# Patient Record
Sex: MALE | Race: WHITE | URBAN - METROPOLITAN AREA
[De-identification: names, ages, dates, MRNs, and addresses within clinical notes are randomized per-mention and may not be internally consistent; named-entity substitution may affect disease eponyms.]

---

## 2022-12-02 ENCOUNTER — CLINICAL DOCUMENTATION (OUTPATIENT)
Dept: ORTHOPEDIC SURGERY | Age: 57
End: 2022-12-02

## 2022-12-02 NOTE — PROGRESS NOTES
Pt brought a MRI disc from the [place] but it couldn't be uploaded and when the images were pulled up they were individual not like a normal MRI and there were not xrays. I told the pt about the disc and then told him we would need to get new xrays at the visit since we couldn't see the images he brought well. The pt stated he has had xrays before and they don't show anything so he wasn't going to get them again. He asked what we needed and I told him a proper copy of his MRI and not individual jpeg images. He said ok and walked out. I canceled his appointment. He didn't take the disc with him so I will keep it at our desk [facility].

## 2023-01-18 ENCOUNTER — OFFICE VISIT (OUTPATIENT)
Dept: ORTHOPEDIC SURGERY | Age: 58
End: 2023-01-18

## 2023-01-18 DIAGNOSIS — M25.562 LEFT KNEE PAIN, UNSPECIFIED CHRONICITY: Primary | ICD-10-CM

## 2023-01-18 RX ORDER — MELOXICAM 7.5 MG/1
7.5 TABLET ORAL DAILY
COMMUNITY

## 2023-01-18 RX ORDER — NITROGLYCERIN 0.4 MG/1
0.4 TABLET SUBLINGUAL
COMMUNITY
Start: 2022-04-26

## 2023-01-18 RX ORDER — AMLODIPINE BESYLATE 10 MG/1
5 TABLET ORAL
COMMUNITY
Start: 2022-04-26

## 2023-01-18 RX ORDER — ISOSORBIDE MONONITRATE 30 MG/1
30 TABLET, EXTENDED RELEASE ORAL
COMMUNITY
Start: 2022-08-26

## 2023-01-18 RX ORDER — IBUPROFEN 800 MG/1
TABLET ORAL
COMMUNITY
Start: 2022-11-01

## 2023-01-18 NOTE — PROGRESS NOTES
Name: Veronika Trinidad  YOB: 1965  Gender: male  MRN: [id number]    CC:   Chief Complaint   Patient presents with    Knee Pain     Left knee     Left  medial KNEE PAIN; STIFFNESS     HPI: 59-year-old male with several history of left medial knee pain. He is treated conservatively for many years including with therapy. He was in the [place] system so it took him a while to get approved to have an MRI once he had it this past November he was referred down to evaluate further. Causes mechanical like symptoms. Allergies   Allergen Reactions    Lamotrigine Rash     History reviewed. No pertinent past medical history. History reviewed. No pertinent surgical history. History reviewed. No pertinent family history. Social History     Socioeconomic History    Marital status:      Spouse name: Not on file    Number of children: Not on file    Years of education: Not on file    Highest education level: Not on file   Occupational History    Not on file   Tobacco Use    Smoking status: Never    Smokeless tobacco: Never   Substance and Sexual Activity    Alcohol use: Not on file    Drug use: Not on file    Sexual activity: Not on file   Other Topics Concern    Not on file   Social History Narrative    Not on file     Social Determinants of Health     Financial Resource Strain: Not on file   Food Insecurity: Not on file   Transportation Needs: Not on file   Physical Activity: Not on file   Stress: Not on file   Social Connections: Not on file   Intimate Partner Violence: Not on file   Housing Stability: Not on file        No flowsheet data found. ROS: Also noted on the patient medical history form in the chart and are reviewed today. Pertinent positives and negatives are addressed with the patient, particularly those related to musculoskeletal concerns. Non-orthopaedic concerns were referred back to the primary care physician.      PE:  General appearance is that of a healthy patient, alert and oriented, in no distress. Neck shows no significant abnormalities. Back and bilateral hips show no significant abnormalities with good ROM and no referral to LE with movement. No tenderness to bilateral hips. R and L upper extremities show no significant abnormalities. Both calves are soft. Dorsalis pedis pulses are 2+ and symmetrical.    Motor and sensory exam is intact and equal in both feet. KNEE Left (involved)  Right   Skin Intact Intact   Atrophy None None   Effusion trace None noted   ROM  full Full   Strength No weakness No weakness   Palpation TTP medial joint line. Non-tender throughout   Patellar Tracking Normal: J sign: negative. Crepitus 1+ None   Patellar Apprehension Negative Negative   Silvina Test positive Negative   Pivot Shift Negative Negative   Post Drawer Negative Negative   Varus  @ 0 and 30deg Negative Negative   Valgus @ 0 and 30deg Negative Negative   Gait Minimally antalgic Normal     MRI LEFT KNEE:       I reviewed the MRI today and independently in the office from 11/15/2020 to the left knee which shows partial tearing of the medial meniscus posterior horn. Cartilage appears relatively normal there this is noted on sagittal image 26. ACL and PCL appear intact there is some degenerative change underneath the patella especially in the upper half. Some cystic change posteriorly. Assessment:      ICD-10-CM    1. 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 Given the nature of the injury and the longevity that he is had as well as his failure of treatment with therapy I think it is prudent to proceed with knee arthroscopy for partial medial meniscectomy. Treatment:   The patient desires arthroscopy of the left knee for partial medial meniscectomy.   I talked with them extensively about the risks, benefits, reasonable expectations and expected recovery time as well as possible complications including but not limited to bleeding, infection, stiffness, pain, continued problems, DVT, PE, MI and other anesthesia related risks etc.  I also talked extensively about the risks of a prolonged or incomplete recovery with any degenerative changes present. The patient seems to understand and wishes to proceed. I gave them education literature and answered their questions. Return for Surgery.      Rebeca Villatoro MD  01/18/23

## 2023-01-23 ENCOUNTER — TELEPHONE (OUTPATIENT)
Dept: ORTHOPEDIC SURGERY | Age: 58
End: 2023-01-23

## 2023-01-23 DIAGNOSIS — M25.562 LEFT KNEE PAIN, UNSPECIFIED CHRONICITY: Primary | ICD-10-CM

## 2023-01-23 NOTE — PERIOP NOTE
Patient verified name and . Order for consent not found in EHR; patient verifies procedure. Type 1B surgery, phone assessment complete. Orders not received. Labs per surgeon: none  Labs per anesthesia protocol: none    Patient answered medical/surgical history questions at their best of ability. All prior to admission medications documented in [facility]. Patient instructed to take the following medications the day of surgery according to anesthesia guidelines with a small sip of water: Bring nitro with yo dos. On the day before surgery please take Acetaminophen 1000mg in the morning and then again before bed. You may substitute for Tylenol 650 mg. Hold all vitamins 7 days prior to surgery and NSAIDS (mobic and Advil)  5 days prior to surgery. Prescription meds to hold:none  Patient instructed on the following:    > Arrive at [facility], time of arrival to be called the day before by 1700  > NPO after midnight, unless otherwise indicated, including gum, mints, and ice chips  > Responsible adult must drive patient to the hospital, stay during surgery, and patient will need supervision 24 hours after anesthesia  > Use antibacterial soap in shower the night before surgery and on the morning of surgery  > All piercings must be removed prior to arrival.    > Leave all valuables (money and jewelry) at home but bring insurance card and ID on DOS.   > You may be required to pay a deductible or co-pay on the day of your procedure. You can pre-pay by calling [phone] if your surgery is at the [facility] or [phone] if your surgery is at the [facility]. > Do not wear make-up, nail polish, lotions, cologne, perfumes, powders, or oil on skin. Artificial nails are not permitted.

## 2023-01-28 ENCOUNTER — ANESTHESIA EVENT (OUTPATIENT)
Dept: SURGERY | Age: 58
End: 2023-01-28
Payer: OTHER GOVERNMENT

## 2023-01-28 NOTE — ANESTHESIA PRE PROCEDURE
Department of Anesthesiology  Preprocedure Note       Name:  Sunni Hopkins   Age:  61 y.o.  :  1965                                          MRN:  [id number]         Date:  2023      Surgeon: Vicky Martin): Radha Fernandez MD    Procedure: Procedure(s):  LEFT KNEE ARTHROSCOPY MEDIAL MENISCECTOMY    Medications prior to admission:   Prior to Admission medications    Medication Sig Start Date End Date Taking? Authorizing Provider   amLODIPine (NORVASC) 10 MG tablet 5 mg as needed 22   Historical Provider, MD   diclofenac sodium (VOLTAREN) 1 % GEL APPLY 4 GRAMS TOPICALLY TWICE A DAY AS NEEDED --AVOID SUNLIGHT 22   Historical Provider, MD   ibuprofen (ADVIL;MOTRIN) 800 MG tablet TAKE 1/2 TO 1 TABLET BY MOUTH THREE TIMES A DAY AS NEEDED TAKE WITH FOOD, DO NOT COMBINE WITH MELOXICAM OR OTHER NSAIDS 22   Historical Provider, MD   meloxicam (MOBIC) 7.5 MG tablet Take 7.5 mg by mouth daily    Historical Provider, MD   nitroGLYCERIN (NITROSTAT) 0.4 MG SL tablet 0.4 mg 22   Historical Provider, MD       Current medications:    No current facility-administered medications for this encounter. Current Outpatient Medications   Medication Sig Dispense Refill    amLODIPine (NORVASC) 10 MG tablet 5 mg as needed      diclofenac sodium (VOLTAREN) 1 % GEL APPLY 4 GRAMS TOPICALLY TWICE A DAY AS NEEDED --AVOID SUNLIGHT      ibuprofen (ADVIL;MOTRIN) 800 MG tablet TAKE 1/2 TO 1 TABLET BY MOUTH THREE TIMES A DAY AS NEEDED TAKE WITH FOOD, DO NOT COMBINE WITH MELOXICAM OR OTHER NSAIDS      meloxicam (MOBIC) 7.5 MG tablet Take 7.5 mg by mouth daily      nitroGLYCERIN (NITROSTAT) 0.4 MG SL tablet 0.4 mg         Allergies: Allergies   Allergen Reactions    Lamotrigine Rash       Problem List:    Patient Active Problem List   Diagnosis Code    Left knee pain M25.562       Past Medical History:        Diagnosis Date    Abnormal stress test     in hx.   Showed possible basal anteroseptal ischemia versus artifact; LV function normal    CAD (coronary artery disease)     Coronary vasospasm ([address] 2004    has intermittent CP, manages with nitro; followed by [employer] Hypertension     stable, off meds    MORTEZA (obstructive sleep apnea)        Past Surgical History:        Procedure Laterality Date    CARDIAC CATHETERIZATION  2004    done after possible MI but normal    WISDOM TOOTH EXTRACTION         Social History:    Social History     Tobacco Use    Smoking status: Never    Smokeless tobacco: Never   Substance Use Topics    Alcohol use: Not Currently                                Counseling given: Not Answered      Vital Signs (Current):   Vitals:    01/23/23 1133   Weight: 180 lb (81.6 kg)   Height: 5' 9\" (1.753 m)                                              BP Readings from Last 3 Encounters:   No data found for BP       NPO Status:                                                                                 BMI:   Wt Readings from Last 3 Encounters:   No data found for Wt     Body mass index is 26.58 kg/m². CBC: No results found for: WBC, RBC, HGB, HCT, MCV, RDW, PLT    CMP: No results found for: NA, K, CL, CO2, BUN, CREATININE, GFRAA, AGRATIO, LABGLOM, GLUCOSE, GLU, PROT, CALCIUM, BILITOT, ALKPHOS, AST, ALT    POC Tests: No results for input(s): POCGLU, POCNA, POCK, POCCL, POCBUN, POCHEMO, POCHCT in the last 72 hours.     Coags: No results found for: PROTIME, INR, APTT    HCG (If Applicable): No results found for: PREGTESTUR, PREGSERUM, HCG, HCGQUANT     ABGs: No results found for: PHART, PO2ART, YPM1VQU, RTV4SBD, BEART, T1SPWGUL     Type & Screen (If Applicable):  No results found for: LABABO, LABRH    Drug/Infectious Status (If Applicable):  No results found for: HIV, HEPCAB    COVID-19 Screening (If Applicable): No results found for: COVID19        Anesthesia Evaluation  Patient summary reviewed  Airway: Mallampati: II  TM distance: >3 FB   Neck ROM: full  Mouth opening: > = 3 FB   Dental: normal exam         Pulmonary:normal exam    (+) sleep apnea:                             Cardiovascular:  Exercise tolerance: good (>4 METS), Swims laps  (+) hypertension:, angina (no recent chest pain):, CAD (cath 2005, coronary vasopasm): non-obstructive,                ROS comment: NST 2022:  normal LV function. Perfusion imaging showed possible mild basal anteroseptal ischemia versus artifact. Neuro/Psych:               GI/Hepatic/Renal:             Endo/Other:                     Abdominal:             Vascular: Other Findings:           Anesthesia Plan      general     ASA 3             Anesthetic plan and risks discussed with patient.                         Idris Centeno MD   1/28/2023

## 2023-01-29 DIAGNOSIS — Z09 SURGERY FOLLOW-UP: Primary | ICD-10-CM

## 2023-01-29 PROBLEM — M23.204 DEGENERATIVE TEAR OF LEFT MEDIAL MENISCUS: Status: ACTIVE | Noted: 2023-01-29

## 2023-01-29 RX ORDER — OXYCODONE HYDROCHLORIDE 5 MG/1
5-10 TABLET ORAL
Qty: 30 TABLET | Refills: 0 | Status: SHIPPED | OUTPATIENT
Start: 2023-01-29 | End: 2023-02-01

## 2023-01-29 RX ORDER — AMOXICILLIN 250 MG
1 CAPSULE ORAL DAILY
Qty: 21 TABLET | Refills: 0 | Status: SHIPPED | OUTPATIENT
Start: 2023-01-29

## 2023-01-29 RX ORDER — ASPIRIN 325 MG
325 TABLET ORAL DAILY
Qty: 7 TABLET | Refills: 0 | Status: SHIPPED | OUTPATIENT
Start: 2023-01-29 | End: 2023-02-05

## 2023-01-29 RX ORDER — ACETAMINOPHEN 500 MG
500 TABLET ORAL 4 TIMES DAILY PRN
Qty: 40 TABLET | Refills: 0 | Status: SHIPPED | OUTPATIENT
Start: 2023-01-29 | End: 2023-02-08

## 2023-01-29 RX ORDER — MELOXICAM 7.5 MG/1
7.5 TABLET ORAL 2 TIMES DAILY PRN
Qty: 28 TABLET | Refills: 0 | Status: SHIPPED | OUTPATIENT
Start: 2023-01-29 | End: 2023-02-12

## 2023-01-29 RX ORDER — ONDANSETRON 8 MG/1
4 TABLET, ORALLY DISINTEGRATING ORAL EVERY 6 HOURS
Qty: 16 TABLET | Refills: 0 | Status: SHIPPED | OUTPATIENT
Start: 2023-01-29

## 2023-01-30 ENCOUNTER — ANESTHESIA (OUTPATIENT)
Dept: SURGERY | Age: 58
End: 2023-01-30
Payer: OTHER GOVERNMENT

## 2023-01-30 ENCOUNTER — HOSPITAL ENCOUNTER (OUTPATIENT)
Age: 58
Setting detail: OUTPATIENT SURGERY
Discharge: HOME | End: 2023-01-30
Attending: ORTHOPAEDIC SURGERY | Admitting: ORTHOPAEDIC SURGERY
Payer: OTHER GOVERNMENT

## 2023-01-30 VITALS
HEIGHT: 69 IN | RESPIRATION RATE: 16 BRPM | BODY MASS INDEX: 26.66 KG/M2 | TEMPERATURE: 60.8 F | SYSTOLIC BLOOD PRESSURE: 132 MMHG | OXYGEN SATURATION: 96 % | WEIGHT: 180 LBS | DIASTOLIC BLOOD PRESSURE: 83 MMHG | HEART RATE: 65 BPM

## 2023-01-30 DIAGNOSIS — Z09 SURGERY FOLLOW-UP: ICD-10-CM

## 2023-01-30 PROCEDURE — 3600000014 HC SURGERY LEVEL 4 ADDTL 15MIN: Performed by: ORTHOPAEDIC SURGERY

## 2023-01-30 PROCEDURE — 3700000001 HC ADD 15 MINUTES (ANESTHESIA): Performed by: ORTHOPAEDIC SURGERY

## 2023-01-30 PROCEDURE — 3600000004 HC SURGERY LEVEL 4 BASE: Performed by: ORTHOPAEDIC SURGERY

## 2023-01-30 PROCEDURE — 6360000002 HC RX W HCPCS: Performed by: NURSE ANESTHETIST, CERTIFIED REGISTERED

## 2023-01-30 PROCEDURE — 6360000002 HC RX W HCPCS: Performed by: PHYSICIAN ASSISTANT

## 2023-01-30 PROCEDURE — 2580000003 HC RX 258: Performed by: NURSE ANESTHETIST, CERTIFIED REGISTERED

## 2023-01-30 PROCEDURE — 7100000001 HC PACU RECOVERY - ADDTL 15 MIN: Performed by: ORTHOPAEDIC SURGERY

## 2023-01-30 PROCEDURE — 2720000010 HC SURG SUPPLY STERILE: Performed by: ORTHOPAEDIC SURGERY

## 2023-01-30 PROCEDURE — 7100000010 HC PHASE II RECOVERY - FIRST 15 MIN: Performed by: ORTHOPAEDIC SURGERY

## 2023-01-30 PROCEDURE — 29881 ARTHRS KNE SRG MNISECTMY M/L: CPT | Performed by: ORTHOPAEDIC SURGERY

## 2023-01-30 PROCEDURE — 6370000000 HC RX 637 (ALT 250 FOR IP): Performed by: ANESTHESIOLOGY

## 2023-01-30 PROCEDURE — 7100000011 HC PHASE II RECOVERY - ADDTL 15 MIN: Performed by: ORTHOPAEDIC SURGERY

## 2023-01-30 PROCEDURE — 6360000002 HC RX W HCPCS: Performed by: ORTHOPAEDIC SURGERY

## 2023-01-30 PROCEDURE — 3700000000 HC ANESTHESIA ATTENDED CARE: Performed by: ORTHOPAEDIC SURGERY

## 2023-01-30 PROCEDURE — 7100000000 HC PACU RECOVERY - FIRST 15 MIN: Performed by: ORTHOPAEDIC SURGERY

## 2023-01-30 PROCEDURE — 2500000003 HC RX 250 WO HCPCS: Performed by: NURSE ANESTHETIST, CERTIFIED REGISTERED

## 2023-01-30 PROCEDURE — 6360000002 HC RX W HCPCS: Performed by: ANESTHESIOLOGY

## 2023-01-30 PROCEDURE — 2709999900 HC NON-CHARGEABLE SUPPLY: Performed by: ORTHOPAEDIC SURGERY

## 2023-01-30 RX ORDER — SODIUM CHLORIDE 9 MG/ML
INJECTION, SOLUTION INTRAVENOUS PRN
Status: DISCONTINUED | OUTPATIENT
Start: 2023-01-30 | End: 2023-01-30 | Stop reason: HOSPADM

## 2023-01-30 RX ORDER — AMOXICILLIN 250 MG
1 CAPSULE ORAL DAILY
Qty: 21 TABLET | Refills: 0 | Status: SHIPPED | OUTPATIENT
Start: 2023-01-30

## 2023-01-30 RX ORDER — ROPIVACAINE HYDROCHLORIDE 5 MG/ML
INJECTION, SOLUTION EPIDURAL; INFILTRATION; PERINEURAL PRN
Status: DISCONTINUED | OUTPATIENT
Start: 2023-01-30 | End: 2023-01-30 | Stop reason: HOSPADM

## 2023-01-30 RX ORDER — SODIUM CHLORIDE 0.9 % (FLUSH) 0.9 %
5-40 SYRINGE (ML) INJECTION PRN
Status: DISCONTINUED | OUTPATIENT
Start: 2023-01-30 | End: 2023-01-30 | Stop reason: HOSPADM

## 2023-01-30 RX ORDER — DEXAMETHASONE SODIUM PHOSPHATE 4 MG/ML
INJECTION, SOLUTION INTRA-ARTICULAR; INTRALESIONAL; INTRAMUSCULAR; INTRAVENOUS; SOFT TISSUE PRN
Status: DISCONTINUED | OUTPATIENT
Start: 2023-01-30 | End: 2023-01-30 | Stop reason: SDUPTHER

## 2023-01-30 RX ORDER — OXYCODONE HYDROCHLORIDE 5 MG/1
5-10 TABLET ORAL
Qty: 30 TABLET | Refills: 0 | Status: SHIPPED | OUTPATIENT
Start: 2023-01-30 | End: 2023-02-02

## 2023-01-30 RX ORDER — SODIUM CHLORIDE 0.9 % (FLUSH) 0.9 %
5-40 SYRINGE (ML) INJECTION EVERY 12 HOURS SCHEDULED
Status: DISCONTINUED | OUTPATIENT
Start: 2023-01-30 | End: 2023-01-30 | Stop reason: HOSPADM

## 2023-01-30 RX ORDER — SODIUM CHLORIDE, SODIUM LACTATE, POTASSIUM CHLORIDE, CALCIUM CHLORIDE 600; 310; 30; 20 MG/100ML; MG/100ML; MG/100ML; MG/100ML
INJECTION, SOLUTION INTRAVENOUS CONTINUOUS PRN
Status: DISCONTINUED | OUTPATIENT
Start: 2023-01-30 | End: 2023-01-30 | Stop reason: SDUPTHER

## 2023-01-30 RX ORDER — LIDOCAINE HYDROCHLORIDE 20 MG/ML
INJECTION, SOLUTION EPIDURAL; INFILTRATION; INTRACAUDAL; PERINEURAL PRN
Status: DISCONTINUED | OUTPATIENT
Start: 2023-01-30 | End: 2023-01-30 | Stop reason: SDUPTHER

## 2023-01-30 RX ORDER — FENTANYL CITRATE 50 UG/ML
INJECTION, SOLUTION INTRAMUSCULAR; INTRAVENOUS PRN
Status: DISCONTINUED | OUTPATIENT
Start: 2023-01-30 | End: 2023-01-30 | Stop reason: SDUPTHER

## 2023-01-30 RX ORDER — ONDANSETRON 2 MG/ML
4 INJECTION INTRAMUSCULAR; INTRAVENOUS
Status: DISCONTINUED | OUTPATIENT
Start: 2023-01-30 | End: 2023-01-30 | Stop reason: HOSPADM

## 2023-01-30 RX ORDER — ASPIRIN 325 MG
325 TABLET ORAL DAILY
Qty: 14 TABLET | Refills: 0 | Status: SHIPPED | OUTPATIENT
Start: 2023-01-30 | End: 2023-01-30

## 2023-01-30 RX ORDER — HYDROMORPHONE HCL 110MG/55ML
0.25 PATIENT CONTROLLED ANALGESIA SYRINGE INTRAVENOUS EVERY 5 MIN PRN
Status: DISCONTINUED | OUTPATIENT
Start: 2023-01-30 | End: 2023-01-30 | Stop reason: HOSPADM

## 2023-01-30 RX ORDER — ASPIRIN 325 MG
325 TABLET ORAL DAILY
Qty: 14 TABLET | Refills: 0 | Status: SHIPPED | OUTPATIENT
Start: 2023-01-30 | End: 2023-02-06

## 2023-01-30 RX ORDER — ONDANSETRON 2 MG/ML
INJECTION INTRAMUSCULAR; INTRAVENOUS PRN
Status: DISCONTINUED | OUTPATIENT
Start: 2023-01-30 | End: 2023-01-30 | Stop reason: SDUPTHER

## 2023-01-30 RX ORDER — OXYCODONE HYDROCHLORIDE 5 MG/1
5 TABLET ORAL PRN
Status: COMPLETED | OUTPATIENT
Start: 2023-01-30 | End: 2023-01-30

## 2023-01-30 RX ORDER — TRANEXAMIC ACID 100 MG/ML
INJECTION, SOLUTION INTRAVENOUS PRN
Status: DISCONTINUED | OUTPATIENT
Start: 2023-01-30 | End: 2023-01-30 | Stop reason: SDUPTHER

## 2023-01-30 RX ORDER — HYDROMORPHONE HCL 110MG/55ML
0.5 PATIENT CONTROLLED ANALGESIA SYRINGE INTRAVENOUS EVERY 5 MIN PRN
Status: DISCONTINUED | OUTPATIENT
Start: 2023-01-30 | End: 2023-01-30 | Stop reason: HOSPADM

## 2023-01-30 RX ORDER — OXYCODONE HYDROCHLORIDE 5 MG/1
10 TABLET ORAL PRN
Status: COMPLETED | OUTPATIENT
Start: 2023-01-30 | End: 2023-01-30

## 2023-01-30 RX ORDER — PROPOFOL 10 MG/ML
INJECTION, EMULSION INTRAVENOUS PRN
Status: DISCONTINUED | OUTPATIENT
Start: 2023-01-30 | End: 2023-01-30 | Stop reason: SDUPTHER

## 2023-01-30 RX ORDER — DIPHENHYDRAMINE HYDROCHLORIDE 50 MG/ML
6.25 INJECTION INTRAMUSCULAR; INTRAVENOUS
Status: DISCONTINUED | OUTPATIENT
Start: 2023-01-30 | End: 2023-01-30 | Stop reason: HOSPADM

## 2023-01-30 RX ORDER — SODIUM CHLORIDE 0.9 % (FLUSH) 0.9 %
5-40 SYRINGE (ML) INJECTION EVERY 8 HOURS
Status: DISCONTINUED | OUTPATIENT
Start: 2023-01-30 | End: 2023-01-30 | Stop reason: HOSPADM

## 2023-01-30 RX ORDER — ONDANSETRON 8 MG/1
4 TABLET, ORALLY DISINTEGRATING ORAL EVERY 6 HOURS
Qty: 16 TABLET | Refills: 0 | Status: SHIPPED | OUTPATIENT
Start: 2023-01-30

## 2023-01-30 RX ADMIN — SODIUM CHLORIDE, SODIUM LACTATE, POTASSIUM CHLORIDE, AND CALCIUM CHLORIDE: 600; 310; 30; 20 INJECTION, SOLUTION INTRAVENOUS at 06:54

## 2023-01-30 RX ADMIN — ONDANSETRON 4 MG: 2 INJECTION INTRAMUSCULAR; INTRAVENOUS at 07:05

## 2023-01-30 RX ADMIN — HYDROMORPHONE HYDROCHLORIDE 0.5 MG: 2 INJECTION, SOLUTION INTRAMUSCULAR; INTRAVENOUS; SUBCUTANEOUS at 08:13

## 2023-01-30 RX ADMIN — FENTANYL CITRATE 25 MCG: 50 INJECTION, SOLUTION INTRAMUSCULAR; INTRAVENOUS at 07:23

## 2023-01-30 RX ADMIN — FENTANYL CITRATE 25 MCG: 50 INJECTION, SOLUTION INTRAMUSCULAR; INTRAVENOUS at 07:16

## 2023-01-30 RX ADMIN — FENTANYL CITRATE 50 MCG: 50 INJECTION, SOLUTION INTRAMUSCULAR; INTRAVENOUS at 06:54

## 2023-01-30 RX ADMIN — PROPOFOL 200 MG: 10 INJECTION, EMULSION INTRAVENOUS at 06:59

## 2023-01-30 RX ADMIN — DEXAMETHASONE SODIUM PHOSPHATE 4 MG: 4 INJECTION, SOLUTION INTRAMUSCULAR; INTRAVENOUS at 07:05

## 2023-01-30 RX ADMIN — FENTANYL CITRATE 25 MCG: 50 INJECTION, SOLUTION INTRAMUSCULAR; INTRAVENOUS at 07:07

## 2023-01-30 RX ADMIN — OXYCODONE HYDROCHLORIDE 10 MG: 5 TABLET ORAL at 08:29

## 2023-01-30 RX ADMIN — LIDOCAINE HYDROCHLORIDE 80 MG: 20 INJECTION, SOLUTION EPIDURAL; INFILTRATION; INTRACAUDAL; PERINEURAL at 06:59

## 2023-01-30 RX ADMIN — TRANEXAMIC ACID 1000 MG: 100 INJECTION, SOLUTION INTRAVENOUS at 07:08

## 2023-01-30 RX ADMIN — Medication 2000 MG: at 07:06

## 2023-01-30 RX ADMIN — HYDROMORPHONE HYDROCHLORIDE 0.5 MG: 2 INJECTION, SOLUTION INTRAMUSCULAR; INTRAVENOUS; SUBCUTANEOUS at 08:01

## 2023-01-30 ASSESSMENT — PAIN DESCRIPTION - LOCATION
LOCATION: KNEE

## 2023-01-30 ASSESSMENT — PAIN SCALES - GENERAL
PAINLEVEL_OUTOF10: 6
PAINLEVEL_OUTOF10: 8
PAINLEVEL_OUTOF10: 8
PAINLEVEL_OUTOF10: 4
PAINLEVEL_OUTOF10: 8

## 2023-01-30 ASSESSMENT — PAIN DESCRIPTION - DESCRIPTORS
DESCRIPTORS: ACHING
DESCRIPTORS: ACHING

## 2023-01-30 ASSESSMENT — PAIN - FUNCTIONAL ASSESSMENT
PAIN_FUNCTIONAL_ASSESSMENT: 0-10
PAIN_FUNCTIONAL_ASSESSMENT: ACTIVITIES ARE NOT PREVENTED

## 2023-01-30 ASSESSMENT — PAIN DESCRIPTION - ORIENTATION
ORIENTATION: LEFT

## 2023-01-30 NOTE — ANESTHESIA PROCEDURE NOTES
Airway  Date/Time: 1/30/2023 7:00 AM  Urgency: elective    Airway not difficult    General Information and Staff    Patient location during procedure: OR  Anesthesiologist: Erlin Dowling MD  Resident/CRNA: ADAL Blake - CRNA  Performed: resident/CRNA     Indications and Patient Condition  Indications for airway management: anesthesia  Spontaneous ventilation: present  Sedation level: deep  Preoxygenated: yes  Patient position: sniffing  MILS not maintained throughout  Mask difficulty assessment: vent by bag mask    Final Airway Details  Final airway type: supraglottic airway      Successful airway: oropharyngeal  Size 4     Number of attempts at approach: 1  Ventilation between attempts: supraglottic airway  Number of other approaches attempted: 0    no

## 2023-01-30 NOTE — DISCHARGE INSTRUCTIONS
Postoperative Instructions - Knee Arthroscopy    Please note these are general instructions for postoperative care. Specific instructions may be given regarding the type of surgery that you have undergone. Postoperative dressings may be removed after 2 to 3 days. Dressings should be kept dry for the first several days. Following removal of the dressing, wounds should be kept dry when showering. A large trash bag taped to the thigh can work well. Do not soak wounds in the tub prior to suture removal.      All steri-strip tapes across the wound should be left in place if your incisions have them. Some discomfort is expected following any operation. You will be given a prescription for pain medication along with instructions for its use. Many pain medications contain Tylenol. Do not take additional Tylenol if you are currently taking the prescribed pain medications. Do not drive while taking pain medications. Do not make important personal or business decisions or sign legal documents the first 24 hours after surgery. If your pain is not adequately controlled, contact your surgeon on call at the numbers on this sheet. Following simple knee arthroscopy crutches are not usually utilized or are used for only 1 or 2 days. Working on regaining full motion of the knee is encouraged. Bending and straightening the knee and performing ankle range of motion is encouraged to prevent blood clots. Try to use a stationary bike without resistance within the first week of surgery to help regain motion. Elevating the lower extremity after surgery is helpful in the first few days postoperatively. This can help control swelling. Applying ice for 15 to 20 minutes per hour to the surgical site is often helpful in decreasing pain and swelling. As pain subsides, discontinuation of narcotic medication is recommended and switching to Tylenol or over the counter anti-inflammatory medication is desirable.     Pain medications can cause constipation, nausea and vomiting, and sometimes itching and hives. Keeping hydrated by taking liquids on a regular basis can help. For constipation, consider over the counter additions like Metamucil or an over-the-counter stool softner. Trying to limit narcotic use can often help with regards to nausea and vomiting. Taking pain medication with a small amount of food is also usually helpful. Itching and hives can occur with pain medication as well as antibiotics that are given during the broderick-operative time. Over the counter Benadryl (25mg every 6 hours) can be helpful in treating itching. If you feel you are progressing slowly, feel free to call our office to get you into physical therapy if you do not already have a prescription. Any fevers (101º or greater) after 2-3 days post-op, increasing redness, drainage, or steadily increasing pain please notify us or come in as soon as possible. Deep vein thrombosis (DVT) is a condition in which a blood clot has formed in a deep vein of the leg. This may result in redness, swelling, warmth and/or pain of the leg. Although these are very rare, there are things that can be done to lessen the risk. It is recommended by your surgeon unless indicated otherwise, after knee arthroscopy; take an adult aspirin once a day for three weeks after surgery to help prevent the formation of blood clots. (Do not take aspirin against the advice of your medical doctor). -   For several days (at least until you are walking about most of the day), rotate your  ankle, bend your toes and move your foot back and forth and from side to side, frequently (at least a few minutes every hour while you are awake) in order to keep blood circulation flowing so that the blood clotting will be less likely to occur. The more circulation, the less chance of blood clotting and a DVT.     If you call the office please have us paged instead of leaving a voice mail so that we can immediately take care of your needs. Phone [phone]              Fax [phone]                                MEDICATION INTERACTION:  During your procedure you potentially received a medication or medications which may reduce the effectiveness of oral contraceptives. Please consider other forms of contraception for 1 month following your procedure if you are currently using oral contraceptives as your primary form of birth control. In addition to this, we recommend continuing your oral contraceptive as prescribed, unless otherwise instructed by your physician, during this time    After general anesthesia or intravenous sedation, for 24 hours or while taking prescription Narcotics:  Limit your activities  A responsible adult needs to be with you for the next 24 hours  Do not drive and operate hazardous machinery  Do not make important personal or business decisions  Do not drink alcoholic beverages  If you have not urinated within 8 hours after discharge, and you are experiencing discomfort from urinary retention, please go to the nearest ED. If you have sleep apnea and have a CPAP machine, please use it for all naps and sleeping. Please use caution when taking narcotics and any of your home medications that may cause drowsiness. *  Please give a list of your current medications to your Primary Care Provider. *  Please update this list whenever your medications are discontinued, doses are      changed, or new medications (including over-the-counter products) are added. *  Please carry medication information at all times in case of emergency situations. These are general instructions for a healthy lifestyle:  No smoking/ No tobacco products/ Avoid exposure to second hand smoke  Surgeon General's Warning:  Quitting smoking now greatly reduces serious risk to your health.   Obesity, smoking, and sedentary lifestyle greatly increases your risk for illness  A healthy diet, regular physical exercise & weight monitoring are important for maintaining a healthy lifestyle    You may be retaining fluid if you have a history of heart failure or if you experience any of the following symptoms:  Weight gain of 3 pounds or more overnight or 5 pounds in a week, increased swelling in our hands or feet or shortness of breath while lying flat in bed. Please call your doctor as soon as you notice any of these symptoms; do not wait until your next office visit.

## 2023-01-30 NOTE — H&P
Outpatient Surgery History and Physical:  Delicia Ortiz was seen and examined. CHIEF COMPLAINT:   left knee pain. PE:   BP (!) 159/95   Pulse 64   Temp 97.8 °F (36.6 °C) (Oral)   Resp 18   Ht 5' 9\" (1.753 m)   Wt 180 lb (81.6 kg)   SpO2 96%   BMI 26.58 kg/m²     Heart:   Regular rhythm, regular pulses. Lungs:  Are clear, non-labored respirations. Principal Problem:    Degenerative tear of left medial meniscus  Active Problems:    Left knee pain  Resolved Problems:    * No resolved hospital problems. *      Past Medical History:    Past Medical History:   Diagnosis Date    Abnormal stress test     in hx. Showed possible basal anteroseptal ischemia versus artifact; LV function normal    CAD (coronary artery disease)     [address]on[address] [address] 2004    has intermittent CP, manages with nitro; followed by [place] [facility]    Hypertension     stable, off meds    MORTEZA (obstructive sleep apnea)        Surgical History:   Past Surgical History:   Procedure Laterality Date    CARDIAC CATHETERIZATION  2004    done after possible MI but normal    WISDOM TOOTH EXTRACTION         Social History: Patient  reports that he has never smoked. He has never used smokeless tobacco. He reports that he does not currently use alcohol. He reports that he does not use drugs. Family History: History reviewed. No pertinent family history. Allergies: Reviewed per EMR  Allergies   Allergen Reactions    Lamotrigine Rash         The surgery is planned for the left knee, arthroscopy and partial medial meniscectomy. History and physical has been reviewed. The patient has been examined. There have been no significant clinical changes since the completion of the originally dated History and Physical.  Patient identified by surgeon; surgical site was confirmed by patient and surgeon. The patient is here today for outpatient surgery.  I have examined the patient, no changes are noted in the patient's medical status. Necessity for the procedure/care is still present and the history and physical above is current. See the office notes for the full long term history of the problem. Please see the recent office notes for the musculoskeletal examination. We have already discussed the clinical implications of both conservative and operative treatments. They would like to proceed with operative treatment. I talked with them extensively about the risks, benefits, reasonable expectations and expected recovery time including long term need for ambulatory assistance as well as possible complications including but not limited to bleeding, infection, need for hardware removal or exchange, neurovascular injury, stiffness, pain, dislocation, continued problems, DVT, PE, hardware failure, other fracture, need for further surgery, heterotopic ossification, MI and other anesthesia related risks, etc. They have exhausted all other options and wish to proceed. The patient was counseled at length about the risks of bahman Covid-19 during their perioperative period and any recovery window from their procedure. The patient was made aware that bahman Covid-19  may worsen their prognosis for recovering from their procedure and lend to a higher morbidity and/or mortality risk. All material risks, benefits, and reasonable alternatives including postponing the procedure were discussed. The patient does  wish to proceed with the procedure at this time.       Signed By: Yris Sauer MD     January 30, 2023 6:51 AM

## 2023-01-30 NOTE — OP NOTE
Operative Note    2023     Preoperative diagnosis:  Left knee pain, unspecified chronicity [M25.562]    Postoperative diagnosis: Left knee partial medial meniscus tear, chondromalacia of the patella    Surgeon(s) and Role:     * SHIRLEY Avila MD - Primary     Assistant: none      Anesthesia: Choice    Tourniquet Time: * Missing tourniquet times found for documented tourniquets in lo *   At 250 mmHg. Antibiotics: Ancef 2 gram IV    Procedures:  Procedure(s):  LEFT KNEE ARTHROSCOPY PARTIAL MEDIAL MENISCECTOMY AND CHONDRAPLASTY   79821    Findings:  1. EUA -   - lachman's and - pivot shift. Stable to varus and valgus. 2. PFJ - Normal appearing trochlea. Mild amount of chondromalacia of the lateral patellar facet. 3. Medial Joint -there was a medial meniscus tear, it was flipped into the meniscotibial recess. .  The cartilage appeared normal on the tibia. There is a little bit of chondromalacia over the lateralmost aspect of the medial femoral condyle. Did not appear to be significantly weightbearing  4. Lateral joint - lateral joint carilage appeared normal. The lateral meniscus was stable to probing and showed no tears present. 5. PCL - stable and intact  6. ACL -  stable and intact     Indications / Consent:   this is a patient with symptoms compatible with a medial meniscus tear. After previous discussions and treatments using both conservative and/or non-operative treatment options the patient elected to proceed with surgery due to continued symptoms. A review of the risks and benefits, including but not limited to infection, stiffness, injury to nerves and vessels, DVT, PE, MI, need for further operations and other anesthesia related risks was performed with the patient. After this review and the review of the likely outcome and potential complications of the procedure, preoperative verbal and written consents were obtained.   The operative procedure and postoperative course were discussed with the patient in detail and the extremity was marked by the patient and myself. Procedure:     the patient was given their anesthetic, placed in a supine position, an EUA was performed and noted above. A lateral post was placed adjacent to the operative leg. The contralateral leg was padded appropriately and lay on the operating table. The surgical leg was prepped with ChloraPrep and draped in the standard fashion. Prior to the beginning of the procedure, a time-out was performed for correct surgical site identification as was marked during the pre-operative meeting. This was confirmed using the written consent and history/physical. Time-out for antibiotic dosing, timing and selection was also performed. An esmarch was used to exsanguinate the leg and the tourniquet was inflated to 250 mmhg. An incision was made and the scope was introduced anterolaterally and an anteromedial portal was developed with the use of spinal needle localization. The patellofemoral joint was viewed and the articular surfaces were normal on the trochlea and with just a little bit of chondromalacia maybe grade 1 on the lateral facet of the patella. The medial and lateral gutters were reviewed and they were normal.  The medial compartment was viewed and the articular surfaces were noted. The medial meniscus was probed and a flap like degenerative type tear was present. It involved 15 % of the meniscus. The notch was viewed and the ACL and PCL were normal.  The lateral compartment was viewed and probed and the articular surface and lateral meniscus was normal.  Attention was turned back to the medial compartment. The medial meniscus was resected and balanced with manual instruments and motorized ron until the rim was probed and felt to be stable. The posteromedial compartment was viewed and no further abnormalities were noted. The scope was then placed superiorly.   Any other loose bits of meniscal debris were removed from the joint. Local anesthetic was injected, 15 ml of Naropin, at the portal sites and intra-articularly. Steri strips and mastasol were used to close the portals and a sterile Allevyn dressing and an Ace wrap were placed on the knee. The tourniquet was deflated at this time. The patient was returned to the recovery room in a satisfactory condition. Post-operative plan: Patient will work on ROM and may progress weightbearing as they feel comfortable. They will start PT for 2 or more visits depending on their progress. Otherwise the patient may refer to post op instructions for wound care and progression of activities. Enteric Aspirin was discussed for DVT prophylaxis. Will follow up in 1 week for wound check and post op review. Pain medications have been provided. Estimated Blood Loss:  5 ml    Fluids:    See anesthesia record.     Implant: * No implants in log *    Closure: Primary    Complications: None    Signed By: Roseann Sykes MD

## 2023-01-30 NOTE — ANESTHESIA POSTPROCEDURE EVALUATION
Department of Anesthesiology  Postprocedure Note    Patient: Loree Soulier  MRN: [id number]  YOB: 1965  Date of evaluation: 1/30/2023      Procedure Summary     Date: 01/30/23 Room / Location: [facility] OP OR 02 / [facility]    Anesthesia Start: 0654 Anesthesia Stop: 7568    Procedure: LEFT KNEE ARTHROSCOPY PARTIAL MEDIAL MENISCECTOMY AND CHONDRAPLASTY (Left: Knee) Diagnosis:       Left knee pain, unspecified chronicity      (Left knee pain, unspecified chronicity [M25.562])    Surgeons: Moira Hines MD Responsible Provider: Liliana Reilly MD    Anesthesia Type: general ASA Status: 3          Anesthesia Type: No value filed.     Osorio Phase I: Osorio Score: 7    Osorio Phase II: Osorio Score: 10      Anesthesia Post Evaluation    Patient location during evaluation: PACU  Patient participation: complete - patient participated  Level of consciousness: awake  Airway patency: patent  Nausea & Vomiting: no nausea  Complications: no  Cardiovascular status: blood pressure returned to baseline and hemodynamically stable  Respiratory status: acceptable  Hydration status: stable  Multimodal analgesia pain management approach

## 2023-02-01 ENCOUNTER — OFFICE VISIT (OUTPATIENT)
Age: 58
End: 2023-02-01
Payer: OTHER GOVERNMENT

## 2023-02-01 DIAGNOSIS — M25.562 LEFT KNEE PAIN, UNSPECIFIED CHRONICITY: Primary | ICD-10-CM

## 2023-02-01 DIAGNOSIS — Z78.9 IMPAIRED MOBILITY AND ADLS: ICD-10-CM

## 2023-02-01 DIAGNOSIS — M25.662 DECREASED RANGE OF MOTION OF LEFT KNEE: ICD-10-CM

## 2023-02-01 DIAGNOSIS — M25.469 KNEE SWELLING: ICD-10-CM

## 2023-02-01 DIAGNOSIS — R29.898 IMPAIRED STRENGTH OF LOWER EXTREMITY: ICD-10-CM

## 2023-02-01 DIAGNOSIS — Z74.09 IMPAIRED MOBILITY AND ADLS: ICD-10-CM

## 2023-02-01 DIAGNOSIS — R68.89 DECREASED ACTIVITY TOLERANCE: ICD-10-CM

## 2023-02-01 PROCEDURE — 97110 THERAPEUTIC EXERCISES: CPT | Performed by: PHYSICAL THERAPIST

## 2023-02-01 PROCEDURE — 97162 PT EVAL MOD COMPLEX 30 MIN: CPT | Performed by: PHYSICAL THERAPIST

## 2023-02-01 NOTE — PROGRESS NOTES
[address] [facility]  [address] [facility] [place] [address]  Dept: [phone]      PHYSICAL THERAPY INITIAL EVALUATION     PT SESSION 1 of 15    TOTAL TIMED PROCEDURE CODES: 25 MIN. TOTAL TIME: 60 MIN. REFERRING PROVIDER: Gilberto Xavier MD  DIAGNOSIS:     ICD-10-CM    1. Left knee pain, unspecified chronicity  M25.562       2. Knee swelling  M25.469       3. Decreased range of motion of left knee  M25.662       4. Impaired strength of lower extremity  R29.898       5. Impaired mobility and ADLs  Z74.09     Z78.9       6. Decreased activity tolerance  R68.89          SURGERY: L knee arthroscopy w partial medial meniscectomy DATE: Mon. 1/30/23  THERAPY PRECAUTIONS:None  CO - MORBIDITIES AFFECTING PLAN OF CARE: CAD    PERTINENT MEDICAL HISTORY     PAST MEDICAL AND SURGICAL HISTORY:  Past Medical History:   Diagnosis Date    Abnormal stress test     in hx. Showed possible basal anteroseptal ischemia versus artifact; LV function normal    CAD (coronary artery disease)     Coronary vasospasm ([facility] [address] 2004    has intermittent CP, manages with nitro; followed by [facility]    Hypertension     stable, off meds    MORTEZA (obstructive sleep apnea)       Past Surgical History:   Procedure Laterality Date    CARDIAC CATHETERIZATION  2004    done after possible MI but normal    KNEE ARTHROSCOPY Left 1/30/2023    LEFT KNEE ARTHROSCOPY PARTIAL MEDIAL MENISCECTOMY AND CHONDRAPLASTY performed by Josiah Rivers MD at [address] reviewed in chart   ALLERGIES:   Allergies   Allergen Reactions    Lamotrigine Rash        SUBJECTIVE     CHIEF COMPLAINTS: L knee pain, swelling, stiffness following arthroscopy. HISTORY OF INJURY:   Several year hx of progressive knee pain w activities led to surgery.   Date symptoms began: Mon. 1/30/23  Emperatriz Hernandez of condition: Recent onset (initial onset within last 3 months)  Primary cause of current episode: Post-surgical  How did symptoms start: Following knee arthroscopy. Describe current symptoms: L knee pain, swelling, stiffness. Received previous outpatient physical therapy for this problem? No    PAIN ASSESSMENT:  Pain location: Left knee    Current (0-10 pain scale): 6/10  Average Pain/symptom intensity (0-10 scale)  Last 24 hours: 5/10  Last week (1-7 days): 8/10  How often do you feel symptoms? Intermittently (0-25%)  Description: sharp and throbbing  Aggravating factors: Walking  Alleviating factors: Avoiding provocative positions or activities; rest or change in or limiting of activity; prescription med; Tylenol. NEURO SCREEN: denies numbness, tingling, and radiating pain    SOCIAL/FUNCTIONAL HISTORY:  How would you rate your overall health? fair  Pt lives with independent spouse in a(n) 1 story house with entry steps. Current DME: none  Work Status: Retired   Sleep: minimally disturbed  [address] [facility] Hx/Interests: Independent and active without physical limitations  Current level of function: Limited w Wbing - standing, walking, negotiating stairs, squatting, walking > 2 blocks    FUNCTIONAL OUTCOME QUESTIONNARE: Lower Extremity Functional Scale: 31/80= 39% function     DIAGNOSTIC EXAMS (per chart review): NA    PATIENT STATED GOALS: Resolve pain; regain mobility    OBJECTIVE     Observation:   Posture: No faults in the frontal or sagittal planes  Swelling/Edema: Mild about the L knee. Skin Integrity: honeycomb bandage intact    Atrophy: Mild L thigh  LEFS FUNCTIONAL QUESTIONNAIRE 2/1/23   RAW SCORE 31/80   % FUNCTION 39   % DYSFUNCTION/LIMITATION 61     PAIN RATING (0 - 10) 2/1/23    PRE 6    POST       KNEE AROM (?) 2/1/23   LEFT   (INVOLVED) 5 - 99   RIGHT   (NON - INVOLVED)    0 - 133     Hip & ankle AROM WFL bilaterally.     RIGHT (NON - INVOLVED) LQ MMT (0 - 5) LEFT (INVOLVED)   5 HIP FLEXION 5   5 HIP ABDUCTION 4+   NT HIP EXTENSION NT   NT HIP ADDUCTION NT   NT HIP ER NT   NT HIP IR NT   5 KNEE EXTENSION 4+   5 KNEE FLEXION 4+ NT ANKLE DF NT   NT ANKLE PF                      NT     Palpation: Tender along the MJL of the L knee. Patella mobility: NT    FUNCTION:   Gait: Independent w/o assistive devices. Moderate antalgia noted. Strides are shortened and hips are relatively abducted. Stair management: NT    Sit to stand: Performs independently but w weight shifted over the non - involved LE. Moderate use of UEs to assist in transitions to/from standing. Today's treatment consisted of an initial evaluation f/b:       39460 - THERAPEUTIC EXERCISE: 25 MIN. PATIENT EDUCATION:   Re the findings of the PT evaluation and the PT POC; the principles of symptom limited activity and exercise and the use of elevation and cold for pain and swelling control. Instruction in/practice of home exercise program requiring verbal and tactile cues by PT. Patient provided w exercise illustrations through [address] [place] [address]. EXERCISES:  Addressing pain and deficits related to ROM and mm function - force production, endurance, neuromuscular coordination for completing ADLs. Access Code: [email or web address]  URL: [email or web address]  Date: 02/01/2023  Prepared by: Maria Dolores Block    Exercises  Supine Heel Slide - 2 x daily - 6 x weekly - 1 sets - 1 - 2 minutes duration:  Supine Short Arc Quad - 2 x daily - 6 x weekly - 1 sets - 5 reps - 5 hold  Supine Quad Set - 2 x daily - 6 x weekly - 1 sets - 5 reps - 5 hold  Active Straight Leg Raise with Quad Set - 2 x daily - 6 x weekly - 1 - 3 sets - 10 reps  Sidelying Hip Abduction - 2 x daily - 6 x weekly - 1 - 3 sets - 10 reps    CLINICAL DECISION MAKING/ASSESSMENT     Personal Factors/co-morbidities affecting POC (1-2 Medium/3+High):   age   Problem List: (1-2 Low/ 3 Medium/ 4+ High):   Pain  Compromised skin integrity  Localized swelling/edema  ROM limitations  Strength deficits  Motor control deficits  Impaired transfers  Impaired gait  Impaired balance/proprioception  Decreased endurance/activity Tolerance  Self care difficulty  ADL/functional limitations/modifications  Restricted social activity  Restricted recreational participation  Decreased Knowledge of Precautions  Decreased [place] with Home Exercise Program  Difficulty sleeping    Clinical decision making:   low complexity with therapist able to easily and confidently determine and predict expectations and future outcomes for the POC. Prognosis:   good   Benefits and precautions of treatment explained to patient. Marisela Bill is a 61 y.o. male who presents to therapy today with evolving/changing clinical presentation (moderate complexity)  related to L knee artrhoscopy. Pt would benefit from skilled physical therapy services to address the deficits noted above for return to prior level of function. PLAN OF CARE     Effective Dates: 2/1/2023 TO 3/17/2023  (42 days). Frequency/Duration: 1 - 2x/wk. for 42 Day(s)  Interventions may include but are not limited to:   (81814) Therapeutic exercise to develop ROM, strength, endurance and flexibility  (52873) Therapeutic activities using dynamic activities to improve function  (70582) Gait training to address mechanics, proper step length and weight shifting to improve household and community mobility as well as overall safety with ADLs  (38012) Manual therapy techniques to improve joint and/or soft tissue mobility, ROM, and function as well as helping to decrease pain/spasms and swelling  Home exercise program (HEP) development  Modalities prn to address pain, spasms, and swelling: (16569/) Electrical stimulation- unattended  (50836) Vasopneumatic compression  (39171) Hot/cold pack    The referring physician has reviewed and approved this evaluation and plan of care as noted by the electronic signature attached to note. GOALS     SHORT - TERM GOALS TO BE ATTAINED BY 2/17/23:  Patient will voice understanding of symptom limited activity principle.   Patient will report maintaining pain ? 4/10 w symptom limited activity, use of cold and elevation, meds and exercise. Patient will report compliance w home program of exercise, cold and elevation. Patient will demonstrate independent skill w initial HEP. LEFS score will improve to ? 40/80 indicating improved WBing function. Extension deficit of involved knee will improve to ? 3? Julieta Thomason Flexion of involved knee will improve to ?115? Julieta Thomason LONG - TERM GOALS TO BE ATTAINED BY 3/17/23:  LEFS score will improve to ? 54/80 indicating improved WBing function that allows patient to resume low impact ADLs. STEADI fall risk assessment places patient at a low risk for falls. Patient will return to normal sleep pattern re quality and duration. Extension deficit of involved knee resolved. Flexion of involved knee will improve to ?125? Julieta Thomason Patient will attain LQ function - ROM, mm strength - endurance, coordination of movement - that allows patient to:  -     sit, stand, squat, kneel, lift ? 10 lbs from the floor. negotiate stairs w a reciprocal gait pattern. negotiate uneven ground safely and w/o need of assistive device.   resume desired activities/attain patient goals of: resolution of knee pan and restoration of mobility    [employer]

## 2023-02-06 ENCOUNTER — OFFICE VISIT (OUTPATIENT)
Age: 58
End: 2023-02-06
Payer: OTHER GOVERNMENT

## 2023-02-06 DIAGNOSIS — R29.898 IMPAIRED STRENGTH OF LOWER EXTREMITY: ICD-10-CM

## 2023-02-06 DIAGNOSIS — M25.662 DECREASED RANGE OF MOTION OF LEFT KNEE: ICD-10-CM

## 2023-02-06 DIAGNOSIS — M25.562 LEFT KNEE PAIN, UNSPECIFIED CHRONICITY: Primary | ICD-10-CM

## 2023-02-06 DIAGNOSIS — Z78.9 IMPAIRED MOBILITY AND ADLS: ICD-10-CM

## 2023-02-06 DIAGNOSIS — M25.469 KNEE SWELLING: ICD-10-CM

## 2023-02-06 DIAGNOSIS — R68.89 DECREASED ACTIVITY TOLERANCE: ICD-10-CM

## 2023-02-06 DIAGNOSIS — Z74.09 IMPAIRED MOBILITY AND ADLS: ICD-10-CM

## 2023-02-06 PROCEDURE — 97110 THERAPEUTIC EXERCISES: CPT | Performed by: PHYSICAL THERAPIST

## 2023-02-06 PROCEDURE — 97016 VASOPNEUMATIC DEVICE THERAPY: CPT | Performed by: PHYSICAL THERAPIST

## 2023-02-06 NOTE — PROGRESS NOTES
[facility]  [address]  Dept: [phone]                               PHYSICAL THERAPY DAILY NOTE     DATE of EVAL: 2/1/23    POC ENDS: 3/17/23    PT SESSION: 2 of 15    DATE of LAST PROGRESS NOTE: ---    DATE of SURGERY: Mon. 1/30/23  WEEKS POST OP: 1    TOTAL TIMED PROCEDURE CODES: 25 MIN. TOTAL TIME: 40 MIN. REFERRING PROVIDER: Kari Moon MD  DIAGNOSIS:     ICD-10-CM    1. Left knee pain, unspecified chronicity  M25.562       2. Knee swelling  M25.469       3. Decreased range of motion of left knee  M25.662       4. Impaired strength of lower extremity  R29.898       5. Impaired mobility and ADLs  Z74.09     Z78.9       6. Decreased activity tolerance  R68.89            SURGERY: L knee arthroscopy w partial medial meniscectomy DATE: Mon. 1/30/23  THERAPY PRECAUTIONS:None  CO - MORBIDITIES AFFECTING PLAN OF CARE: CAD    PERTINENT MEDICAL HISTORY     PAST MEDICAL AND SURGICAL HISTORY:  Past Medical History:   Diagnosis Date    Abnormal stress test     in hx. Showed possible basal anteroseptal ischemia versus artifact; LV function normal    CAD (coronary artery disease)     Coronary vasospasm ([facility] [address] 2004    has intermittent CP, manages with nitro; followed by [place] [facility]    Hypertension     stable, off meds    MORTEZA (obstructive sleep apnea)       Past Surgical History:   Procedure Laterality Date    CARDIAC CATHETERIZATION  2004    done after possible MI but normal    KNEE ARTHROSCOPY Left 1/30/2023    LEFT KNEE ARTHROSCOPY PARTIAL MEDIAL MENISCECTOMY AND CHONDRAPLASTY performed by Jennifer Warren MD at [address] reviewed in chart   ALLERGIES:   Allergies   Allergen Reactions    Lamotrigine Rash        HISTORY RELATED TO CURRENT COMPLAINT     CHIEF COMPLAINTS: L knee pain, swelling, stiffness following arthroscopy. HISTORY OF INJURY:   Several year hx of progressive knee pain w activities led to surgery.   Date symptoms began: Mon. 1/30/23  Marion Krabbe of condition: Recent onset (initial onset within last 3 months)  Primary cause of current episode: Post-surgical  How did symptoms start: Following knee arthroscopy. Describe current symptoms: L knee pain, swelling, stiffness. Received previous outpatient physical therapy for this problem? No    PAIN ASSESSMENT:  Pain location: Left knee    Current (0-10 pain scale): 6/10  Average Pain/symptom intensity (0-10 scale)  Last 24 hours: 5/10  Last week (1-7 days): 8/10  How often do you feel symptoms? Intermittently (0-25%)  Description: sharp and throbbing  Aggravating factors: Walking  Alleviating factors: Avoiding provocative positions or activities; rest or change in or limiting of activity; prescription med; Tylenol. NEURO SCREEN: denies numbness, tingling, and radiating pain    SOCIAL/FUNCTIONAL HISTORY:  How would you rate your overall health? fair  Pt lives with independent spouse in a(n) 1 story house with entry steps. Current DME: none  Work Status: Retired   Sleep: minimally disturbed  [address] [facility] Hx/Interests: Independent and active without physical limitations  Current level of function: Limited w Wbing - standing, walking, negotiating stairs, squatting, walking > 2 blocks    FUNCTIONAL OUTCOME QUESTIONNARE: Lower Extremity Functional Scale: 31/80= 39% function     DIAGNOSTIC EXAMS (per chart review): NA    PATIENT STATED GOALS: Resolve pain; regain mobility    SUBJECTIVE     Pt reports:  Knee feels better than I expected at this stage. OBJECTIVE     FINDINGS:  Observation:   Posture: No faults in the frontal or sagittal planes  Swelling/Edema: Mild about the L knee. Skin Integrity: Steri strips cover portals.     Atrophy: Mild L thigh  LEFS FUNCTIONAL QUESTIONNAIRE 2/1/23   RAW SCORE 31/80   % FUNCTION 39   % DYSFUNCTION/LIMITATION 61     PAIN RATING (0 - 10) 2/1/23 2/6/23   PRE 6 1   POST       KNEE AROM (?) 2/1/23 2/6/23   LEFT   (INVOLVED) 5 - 99 0 - 129   RIGHT   (NON - INVOLVED)    0 - 133      Palpation: Tender along the MJL of the L knee. Patella mobility: NT    FUNCTION:   Gait: Independent w/o assistive devices. Mild antalgia noted. Strides are shortened and hips are relatively abducted. Stair management: NT    TREATMENT PROVIDED TODAY:  83330 - THERAPEUTIC EXERCISE: 25 MIN. To address pain and deficits related to ROM and mm function - force production, endurance, neuromuscular coordination for completing ADLs. PATIENT EDUCATION: Reviewed initial POC including modification of activities and the use of heat and/or cold. Reviewed initial HEP w patient demonstrating each exercise and PT correcting technique as needed w redemonstration, verbal or tactile cueing. Patient instructed to maintain at 3 sets of each exercise and to progress repetitions by 1 - 2. PT answering any patient questions. EXERCISES:  Heel slides: 10 sec hold/2 min. L SLRs: 30 reps  L hip abduction: 30 reps  L clamshells: 2/10  L knee flexion - standin/38 - click w mild discomfort reported. Stationary bike (seat position/intensity/duration): Attempted but stopped due to provocation. Addressing pain and deficits related to ROM and mm function - force production, endurance, neuromuscular coordination for completing ADLs. Access Code: [email or web address]  URL: [email or web address]  Date: 2023  Prepared by: Kelley Puri    Exercises  Supine Heel Slide - 2 x daily - 6 x weekly - 1 sets - 1 - 2 minutes duration:  Supine Short Arc Quad - 2 x daily - 6 x weekly - 1 sets - 5 reps - 5 hold  Supine Quad Set - 2 x daily - 6 x weekly - 1 sets - 5 reps - 5 hold  Active Straight Leg Raise with Quad Set - 2 x daily - 6 x weekly - 1 - 3 sets - 10 reps  Sidelying Hip Abduction - 2 x daily - 6 x weekly - 1 - 3 sets - 10 reps    ASSESSMENT     Patient is now 1 week post arthroscopic partial medial meniscectomy.     Patient reports/demonstrates the following improvements:  Decreased pain. Increased knee AROM w full extension and flexion approaching that of the non - involved knee. Diminished antalgia w gait. Able to sleep w/o limitation due to the knee. The following impairments are noted:  Joint effusion. Antalgic gait. Mm strength - endurance of the L LE. These impairments contribute to the following functional limitations: Tolerance to stationary biking. Tolerance to prolonged standing, walking. Squatting, kneeling, lifting from the floor. Negotiating stairs or uneven terrain. PT GOAL(S) ATTAINED:    PT GOAL(S) NOT ATTAINED:    PLAN     Evaluate response to  today's session. Progress as response indicates. Continue vaso, elevation for pain and swelling. GOALS     SHORT - TERM GOALS TO BE ATTAINED BY 2/17/23:  Patient will voice understanding of symptom limited activity principle. Patient will report maintaining pain ? 4/10 w symptom limited activity, use of cold and elevation, meds and exercise. Patient will report compliance w home program of exercise, cold and elevation. Patient will demonstrate independent skill w initial HEP. LEFS score will improve to ? 40/80 indicating improved WBing function. Extension deficit of involved knee will improve to ? 3? An Lazaro Flexion of involved knee will improve to ?115? An Lazaro LONG - TERM GOALS TO BE ATTAINED BY 3/17/23:  LEFS score will improve to ? 54/80 indicating improved WBing function that allows patient to resume low impact ADLs. STEADI fall risk assessment places patient at a low risk for falls. Patient will return to normal sleep pattern re quality and duration. Extension deficit of involved knee resolved. Flexion of involved knee will improve to ?125? An Lazaro Patient will attain LQ function - ROM, mm strength - endurance, coordination of movement - that allows patient to:  -     sit, stand, squat, kneel, lift ? 10 lbs from the floor. negotiate stairs w a reciprocal gait pattern.   negotiate uneven ground safely and w/o need of assistive device.   resume desired activities/attain patient goals of: resolution of knee pan and restoration of mobility    [employer]

## 2023-02-08 ENCOUNTER — OFFICE VISIT (OUTPATIENT)
Dept: ORTHOPEDIC SURGERY | Age: 58
End: 2023-02-08

## 2023-02-08 DIAGNOSIS — Z09 SURGERY FOLLOW-UP: ICD-10-CM

## 2023-02-08 DIAGNOSIS — M25.562 LEFT KNEE PAIN, UNSPECIFIED CHRONICITY: Primary | ICD-10-CM

## 2023-02-08 PROCEDURE — 99024 POSTOP FOLLOW-UP VISIT: CPT | Performed by: ORTHOPAEDIC SURGERY

## 2023-02-08 NOTE — PROGRESS NOTES
Name: Wali Claros  YOB: 1965  Gender: male  MRN: [id number]    CC:   Chief Complaint   Patient presents with    Knee Pain     First post op left knee scope partial medial meniscectomy, chondroplasty DOS 1/30/23      Left Knee Arthroscopy Partial Medial Meniscectomy And Chondraplasty - Left  1/30/2023    HPI: The patient comes in approximately 1 week out from their knee arthroscopy. They are having some occasional pain and swelling, otherwise, doing well. Review of Systems  Noncontributory     PE:    good active motion, trace effusion. Portals are well healed without signs of infection. Calf is soft, nontender. A/Plan:     ICD-10-CM    1. Left knee pain, unspecified chronicity  M25.562       2. Surgery follow-up  Z09         Continue with the strengthening program.  Follow-up 3-4 weeks. I did go over the arthroscopic findings with the patient. Return in about 4 weeks (around 3/8/2023).      Rendall Claude, MD  02/08/23

## 2023-02-09 ENCOUNTER — TELEPHONE (OUTPATIENT)
Age: 58
End: 2023-02-09

## 2023-02-09 NOTE — TELEPHONE ENCOUNTER
Patient missed his PT appt. Called and he stated he had forgotten he had an appt today. At this time he does not feel he needs additional PT but will call if he feels it is necessary. Cancelled his remaining appts. If he has not returned by 3/10 will close [address] [place] [address] and PA.

## 2023-02-28 PROBLEM — Z09 SURGERY FOLLOW-UP: Status: RESOLVED | Noted: 2023-01-29 | Resolved: 2023-02-28

## 2023-03-08 ENCOUNTER — OFFICE VISIT (OUTPATIENT)
Dept: ORTHOPEDIC SURGERY | Age: 58
End: 2023-03-08

## 2023-03-08 DIAGNOSIS — Z09 SURGERY FOLLOW-UP: ICD-10-CM

## 2023-03-08 DIAGNOSIS — M25.562 LEFT KNEE PAIN, UNSPECIFIED CHRONICITY: Primary | ICD-10-CM

## 2023-03-08 PROCEDURE — 99024 POSTOP FOLLOW-UP VISIT: CPT | Performed by: ORTHOPAEDIC SURGERY

## 2023-03-08 NOTE — PROGRESS NOTES
Name: Prosper Lamb  YOB: 1965  Gender: male  MRN: [id number]    CC:   Chief Complaint   Patient presents with    Follow-up     S/P left knee scope partial medial meniscectomy, chondroplasty DOS 1-30-23     Left Knee Arthroscopy Partial Medial Meniscectomy And Chondraplasty - Left  1/30/2023    HPI:   Patient doing well 6 weeks out from knee arthroscopy. Review of Systems  Noncontributory     PE: they have good active motion, full. Good quad tone and definition. No tenderness. Calf is soft. A/Plan:     ICD-10-CM    1. Left knee pain, unspecified chronicity  M25.562       2. Surgery follow-up  Z09         They will continue with a home exercise program.    Use anti-inflammatories prn. Return if symptoms worsen or fail to improve.      Lisa Rowe MD  03/08/23

## 2023-04-04 ENCOUNTER — APPOINTMENT (RX ONLY)
Dept: URBAN - METROPOLITAN AREA CLINIC 329 | Facility: CLINIC | Age: 58
Setting detail: DERMATOLOGY
End: 2023-04-04

## 2023-04-04 DIAGNOSIS — L71.8 OTHER ROSACEA: ICD-10-CM | Status: INADEQUATELY CONTROLLED

## 2023-04-04 DIAGNOSIS — L57.0 ACTINIC KERATOSIS: ICD-10-CM | Status: INADEQUATELY CONTROLLED

## 2023-04-04 DIAGNOSIS — L73.8 OTHER SPECIFIED FOLLICULAR DISORDERS: ICD-10-CM | Status: STABLE

## 2023-04-04 DIAGNOSIS — L81.4 OTHER MELANIN HYPERPIGMENTATION: ICD-10-CM | Status: STABLE

## 2023-04-04 PROBLEM — L30.9 DERMATITIS, UNSPECIFIED: Status: ACTIVE | Noted: 2023-04-04

## 2023-04-04 PROCEDURE — ? FULL BODY SKIN EXAM - DECLINED

## 2023-04-04 PROCEDURE — ? TREATMENT REGIMEN

## 2023-04-04 PROCEDURE — 99203 OFFICE O/P NEW LOW 30 MIN: CPT | Mod: 25

## 2023-04-04 PROCEDURE — ? LIQUID NITROGEN

## 2023-04-04 PROCEDURE — 17000 DESTRUCT PREMALG LESION: CPT

## 2023-04-04 PROCEDURE — ? COUNSELING

## 2023-04-04 PROCEDURE — 17003 DESTRUCT PREMALG LES 2-14: CPT

## 2023-04-04 PROCEDURE — ? ADDITIONAL NOTES

## 2023-04-04 ASSESSMENT — LOCATION SIMPLE DESCRIPTION DERM
LOCATION SIMPLE: RIGHT CHEEK
LOCATION SIMPLE: LEFT TEMPLE
LOCATION SIMPLE: SUPERIOR FOREHEAD
LOCATION SIMPLE: LEFT FOREHEAD
LOCATION SIMPLE: LEFT CHEEK
LOCATION SIMPLE: RIGHT FOREHEAD

## 2023-04-04 ASSESSMENT — LOCATION DETAILED DESCRIPTION DERM
LOCATION DETAILED: LEFT FOREHEAD
LOCATION DETAILED: LEFT SUPERIOR MEDIAL MALAR CHEEK
LOCATION DETAILED: LEFT MEDIAL MALAR CHEEK
LOCATION DETAILED: SUPERIOR MID FOREHEAD
LOCATION DETAILED: RIGHT CENTRAL MALAR CHEEK
LOCATION DETAILED: RIGHT FOREHEAD
LOCATION DETAILED: LEFT INFERIOR TEMPLE
LOCATION DETAILED: LEFT CENTRAL MALAR CHEEK
LOCATION DETAILED: LEFT MEDIAL FOREHEAD
LOCATION DETAILED: LEFT INFERIOR CENTRAL MALAR CHEEK
LOCATION DETAILED: RIGHT INFERIOR CENTRAL MALAR CHEEK

## 2023-04-04 ASSESSMENT — LOCATION ZONE DERM: LOCATION ZONE: FACE

## 2023-04-04 NOTE — HPI: SKIN LESION
How Severe Is Your Skin Lesion?: mild
Is This A New Presentation, Or A Follow-Up?: Skin Lesion
Additional History: Patient states he has a lesion on his left cheek that has been present for a few months. He states it is painful.

## 2023-04-04 NOTE — PROCEDURE: ADDITIONAL NOTES
Detail Level: Simple
Additional Notes: Patient consent was obtained to proceed with the visit and recommended plan of care after discussion of all risks and benefits, including the risks of COVID-19 exposure.
Render Risk Assessment In Note?: no
Additional Notes: Discussed treatment options and stated we could either treat with LN2 or a topical chemo cream. Photos of potential reactions to the topical chemo cream were shown. Patient is okay with proceeding with LN2.

## 2023-04-04 NOTE — HPI: RASH
How Severe Is Your Rash?: mild
Is This A New Presentation, Or A Follow-Up?: Rash
Additional History: Patient states that his cheek will get red when out in the sun. He states that they tend to return to its normal state when he’s out of the sun. Patient is unsure of any other triggers.

## 2025-03-24 ENCOUNTER — TRANSCRIBE ORDERS (OUTPATIENT)
Dept: SCHEDULING | Age: 60
End: 2025-03-24

## 2025-03-24 DIAGNOSIS — G89.29 OTHER CHRONIC PAIN: ICD-10-CM

## 2025-03-24 DIAGNOSIS — G89.29 CHRONIC PAIN OF RIGHT KNEE: Primary | ICD-10-CM

## 2025-03-24 DIAGNOSIS — M25.561 CHRONIC PAIN OF RIGHT KNEE: Primary | ICD-10-CM

## 2025-04-23 ENCOUNTER — TELEPHONE (OUTPATIENT)
Dept: ORTHOPEDIC SURGERY | Age: 60
End: 2025-04-23

## 2025-04-23 NOTE — TELEPHONE ENCOUNTER
Received rquest from [facility] for RFS forms and Visit notes.   Scanned this request In media with message to Dr. English's team

## 2025-04-24 ENCOUNTER — CLINICAL DOCUMENTATION (OUTPATIENT)
Dept: ORTHOPEDIC SURGERY | Age: 60
End: 2025-04-24

## 2025-05-08 ENCOUNTER — APPOINTMENT (OUTPATIENT)
Dept: URBAN - METROPOLITAN AREA CLINIC 329 | Facility: CLINIC | Age: 60
Setting detail: DERMATOLOGY
End: 2025-05-08

## 2025-05-08 DIAGNOSIS — D18.0 HEMANGIOMA: ICD-10-CM

## 2025-05-08 DIAGNOSIS — L57.0 ACTINIC KERATOSIS: ICD-10-CM

## 2025-05-08 DIAGNOSIS — L82.1 OTHER SEBORRHEIC KERATOSIS: ICD-10-CM

## 2025-05-08 DIAGNOSIS — L72.0 EPIDERMAL CYST: ICD-10-CM

## 2025-05-08 DIAGNOSIS — L81.4 OTHER MELANIN HYPERPIGMENTATION: ICD-10-CM

## 2025-05-08 DIAGNOSIS — D22 MELANOCYTIC NEVI: ICD-10-CM

## 2025-05-08 PROBLEM — D18.01 HEMANGIOMA OF SKIN AND SUBCUTANEOUS TISSUE: Status: ACTIVE | Noted: 2025-05-08

## 2025-05-08 PROBLEM — D22.72 MELANOCYTIC NEVI OF LEFT LOWER LIMB, INCLUDING HIP: Status: ACTIVE | Noted: 2025-05-08

## 2025-05-08 PROBLEM — D22.62 MELANOCYTIC NEVI OF LEFT UPPER LIMB, INCLUDING SHOULDER: Status: ACTIVE | Noted: 2025-05-08

## 2025-05-08 PROBLEM — D22.5 MELANOCYTIC NEVI OF TRUNK: Status: ACTIVE | Noted: 2025-05-08

## 2025-05-08 PROCEDURE — ? FULL BODY SKIN EXAM

## 2025-05-08 PROCEDURE — ? PHOTO-DOCUMENTATION

## 2025-05-08 PROCEDURE — ? COUNSELING

## 2025-05-08 PROCEDURE — ? PRESCRIPTION

## 2025-05-08 PROCEDURE — ? PRESCRIPTION MEDICATION MANAGEMENT

## 2025-05-08 PROCEDURE — ? ADDITIONAL NOTES

## 2025-05-08 PROCEDURE — ? TREATMENT REGIMEN

## 2025-05-08 PROCEDURE — 99214 OFFICE O/P EST MOD 30 MIN: CPT

## 2025-05-08 RX ORDER — FLUOROURACIL 0.04 G/G
CREAM TOPICAL
Qty: 40 | Refills: 2 | Status: ERX | COMMUNITY
Start: 2025-05-08

## 2025-05-08 RX ORDER — DESONIDE OINTMENT, 0.05% 0.5 MG/G
PEA-SIZED AMOUNT OINTMENT TOPICAL
Qty: 60 | Refills: 0 | Status: ERX | COMMUNITY
Start: 2025-05-08

## 2025-05-08 RX ADMIN — DESONIDE OINTMENT, 0.05% PEA-SIZED AMOUNT: 0.5 OINTMENT TOPICAL at 00:00

## 2025-05-08 RX ADMIN — FLUOROURACIL: 0.04 CREAM TOPICAL at 00:00

## 2025-05-08 ASSESSMENT — LOCATION DETAILED DESCRIPTION DERM
LOCATION DETAILED: PERIUMBILICAL SKIN
LOCATION DETAILED: RIGHT SUPERIOR MEDIAL MIDBACK
LOCATION DETAILED: RIGHT SUPERIOR LATERAL MALAR CHEEK
LOCATION DETAILED: EPIGASTRIC SKIN
LOCATION DETAILED: RIGHT SUPERIOR UPPER BACK
LOCATION DETAILED: RIGHT LATERAL FOREHEAD
LOCATION DETAILED: LEFT ANTERIOR PROXIMAL THIGH
LOCATION DETAILED: RIGHT DISTAL PRETIBIAL REGION
LOCATION DETAILED: LEFT MEDIAL EYEBROW
LOCATION DETAILED: LEFT FOREHEAD
LOCATION DETAILED: NASAL DORSUM
LOCATION DETAILED: LEFT SUPERIOR LATERAL MALAR CHEEK
LOCATION DETAILED: LEFT PROXIMAL DORSAL FOREARM
LOCATION DETAILED: LEFT MEDIAL EYEBROW
LOCATION DETAILED: RIGHT POSTERIOR SHOULDER
LOCATION DETAILED: RIGHT LATERAL MALAR CHEEK
LOCATION DETAILED: RIGHT MID TEMPLE
LOCATION DETAILED: LEFT INFERIOR LATERAL FOREHEAD
LOCATION DETAILED: RIGHT DISTAL RADIAL DORSAL FOREARM
LOCATION DETAILED: LEFT LATERAL ABDOMEN

## 2025-05-08 ASSESSMENT — LOCATION ZONE DERM
LOCATION ZONE: NOSE
LOCATION ZONE: ARM
LOCATION ZONE: FACE
LOCATION ZONE: TRUNK
LOCATION ZONE: LEG
LOCATION ZONE: FACE

## 2025-05-08 ASSESSMENT — LOCATION SIMPLE DESCRIPTION DERM
LOCATION SIMPLE: RIGHT FOREARM
LOCATION SIMPLE: RIGHT PRETIBIAL REGION
LOCATION SIMPLE: RIGHT TEMPLE
LOCATION SIMPLE: LEFT EYEBROW
LOCATION SIMPLE: RIGHT SHOULDER
LOCATION SIMPLE: LEFT THIGH
LOCATION SIMPLE: RIGHT CHEEK
LOCATION SIMPLE: RIGHT UPPER BACK
LOCATION SIMPLE: LEFT FOREHEAD
LOCATION SIMPLE: RIGHT LOWER BACK
LOCATION SIMPLE: ABDOMEN
LOCATION SIMPLE: RIGHT FOREHEAD
LOCATION SIMPLE: LEFT EYEBROW
LOCATION SIMPLE: LEFT CHEEK
LOCATION SIMPLE: LEFT FOREARM
LOCATION SIMPLE: NOSE

## 2025-05-08 NOTE — PROCEDURE: PRESCRIPTION MEDICATION MANAGEMENT
Detail Level: Zone
Plan: Brittani recommended on the face and ears once daily for 2 weeks during the fall.
Initiate Treatment: [place] 4 % topical cream \\nQuantity: 40.0 g\\nSig: Apply to affected areas once daily for 4 weeks\\n\\ndesonide 0.05 % topical ointment Once or twice daily\\nQuantity: 60.0 g  Days Supply: 30\\nSig: Apply pea-sized amount topically once or twice daily.
Render In Strict Bullet Format?: No

## 2025-05-08 NOTE — PROCEDURE: ADDITIONAL NOTES
Detail Level: Simple
Render Risk Assessment In Note?: no
Additional Notes: If patient would like removed in the future p, will send to plastics

## 2025-05-08 NOTE — PROCEDURE: TREATMENT REGIMEN
Patient transported to [address] [place] [address]  08/09/22 2430
[facility] p/u 1145 to [place] [facility] [address], RN  08/09/22 1124
Detail Level: Zone

## (undated) DEVICE — MASTISOL ADHESIVE LIQ 2/3ML

## (undated) DEVICE — ZIMMER® STERILE DISPOSABLE TOURNIQUET CUFF WITH PLC, DUAL PORT, SINGLE BLADDER, 30 IN. (76 CM)

## (undated) DEVICE — KNEE ARTHROSCOPY DR KOCH: Brand: MEDLINE INDUSTRIES, INC.

## (undated) DEVICE — SINGLE PORT MANIFOLD: Brand: NEPTUNE 2

## (undated) DEVICE — WEREWOLF FLOW 90 COBLATION WAND: Brand: COBLATION

## (undated) DEVICE — ENDOSCOPIC ELECTROSURGICAL(5)

## (undated) DEVICE — GLOVE ORANGE PI 7   MSG9070

## (undated) DEVICE — GLOVE SURG SZ 7 L12IN FNGR THK79MIL GRN LTX FREE

## (undated) DEVICE — BLADE SHV L13CM DIA4MM CVD DISECT AGG COOLCUT

## (undated) DEVICE — SOLUTION IRRIG 3000ML 0.9% SOD CHL USP UROMATIC PLAS CONT

## (undated) DEVICE — SYRINGE MED 30ML STD CLR PLAS LUERLOCK TIP N CTRL DISP

## (undated) DEVICE — BANDAGE COMPR W6INXL12FT SMOOTH FOR LIMB EXSANG ESMARCH